# Patient Record
Sex: MALE | Race: WHITE | NOT HISPANIC OR LATINO | Employment: FULL TIME | URBAN - METROPOLITAN AREA
[De-identification: names, ages, dates, MRNs, and addresses within clinical notes are randomized per-mention and may not be internally consistent; named-entity substitution may affect disease eponyms.]

---

## 2021-05-21 ENCOUNTER — RECORDS - HEALTHEAST (OUTPATIENT)
Dept: GENERAL RADIOLOGY | Facility: CLINIC | Age: 61
End: 2021-05-21

## 2021-05-21 ENCOUNTER — OFFICE VISIT - HEALTHEAST (OUTPATIENT)
Dept: FAMILY MEDICINE | Facility: CLINIC | Age: 61
End: 2021-05-21

## 2021-05-21 DIAGNOSIS — K22.719 BARRETT'S ESOPHAGUS WITH DYSPLASIA: ICD-10-CM

## 2021-05-21 DIAGNOSIS — R07.9 CHEST PAIN, UNSPECIFIED: ICD-10-CM

## 2021-05-21 DIAGNOSIS — R07.9 ACUTE CHEST PAIN: ICD-10-CM

## 2021-05-21 LAB
ALBUMIN SERPL-MCNC: 4.2 G/DL (ref 3.5–5)
ALP SERPL-CCNC: 49 U/L (ref 45–120)
ALT SERPL W P-5'-P-CCNC: 43 U/L (ref 0–45)
ANION GAP SERPL CALCULATED.3IONS-SCNC: 9 MMOL/L (ref 5–18)
AST SERPL W P-5'-P-CCNC: 33 U/L (ref 0–40)
BILIRUB SERPL-MCNC: 1 MG/DL (ref 0–1)
BUN SERPL-MCNC: 12 MG/DL (ref 8–22)
CALCIUM SERPL-MCNC: 10 MG/DL (ref 8.5–10.5)
CHLORIDE BLD-SCNC: 100 MMOL/L (ref 98–107)
CO2 SERPL-SCNC: 28 MMOL/L (ref 22–31)
CREAT SERPL-MCNC: 1.17 MG/DL (ref 0.7–1.3)
D DIMER PPP FEU-MCNC: <=0.27 FEU UG/ML
ERYTHROCYTE [DISTWIDTH] IN BLOOD BY AUTOMATED COUNT: 12.4 % (ref 11–14.5)
FERRITIN SERPL-MCNC: 170 NG/ML (ref 27–300)
GFR SERPL CREATININE-BSD FRML MDRD: >60 ML/MIN/1.73M2
GLUCOSE BLD-MCNC: 84 MG/DL (ref 70–125)
HCT VFR BLD AUTO: 45.5 % (ref 40–54)
HGB BLD-MCNC: 15.5 G/DL (ref 14–18)
MAGNESIUM SERPL-MCNC: 2.2 MG/DL (ref 1.8–2.6)
MCH RBC QN AUTO: 31.3 PG (ref 27–34)
MCHC RBC AUTO-ENTMCNC: 34.1 G/DL (ref 32–36)
MCV RBC AUTO: 92 FL (ref 80–100)
PLATELET # BLD AUTO: 240 THOU/UL (ref 140–440)
PMV BLD AUTO: 9.9 FL (ref 7–10)
POTASSIUM BLD-SCNC: 4 MMOL/L (ref 3.5–5)
PROT SERPL-MCNC: 7.7 G/DL (ref 6–8)
RBC # BLD AUTO: 4.95 MILL/UL (ref 4.4–6.2)
SODIUM SERPL-SCNC: 137 MMOL/L (ref 136–145)
TROPONIN I SERPL-MCNC: <0.01 NG/ML (ref 0–0.29)
WBC: 7.2 THOU/UL (ref 4–11)

## 2021-05-21 ASSESSMENT — MIFFLIN-ST. JEOR: SCORE: 1604.13

## 2021-05-25 LAB
ATRIAL RATE - MUSE: 70 BPM
DIASTOLIC BLOOD PRESSURE - MUSE: NORMAL
INTERPRETATION ECG - MUSE: NORMAL
P AXIS - MUSE: 72 DEGREES
PR INTERVAL - MUSE: 190 MS
QRS DURATION - MUSE: 82 MS
QT - MUSE: 376 MS
QTC - MUSE: 406 MS
R AXIS - MUSE: 58 DEGREES
SYSTOLIC BLOOD PRESSURE - MUSE: NORMAL
T AXIS - MUSE: 50 DEGREES
VENTRICULAR RATE- MUSE: 70 BPM

## 2021-06-17 NOTE — PROGRESS NOTES
Assessment and Plan:     1. Acute chest pain  Electrocardiogram Perform and Read    XR Chest 2 Views    Troponin I    D-dimer, Quantitative    Echo Stress Exercise    Ambulatory referral to Gastroenterology - [place] [id number](CBC w/o Differential)    Ferritin   2. Mathew's esophagus with dysplasia  Ambulatory referral to Gastroenterology [place] [id number](CBC w/o Differential)    Comprehensive Metabolic Panel    Magnesium     Differentials include acute coronary syndrome, PE, asthma, emphysema, pneumonia, costochondritis, myofascial pain, anxiety, GERD.  EKG shows no acute changes.  Will have cardiology review.  Chest x-ray shows no acute infiltrate or cardiomegaly.  Will have radiology review.  Will obtain troponin and D-dimer.  Will order stress echocardiogram for further evaluation.  Offered referral to cardiology, but he declines.  Will rule out iron deficiency anemia, hypomagnesemia due to history of Mathew's esophagus.  Reflux symptoms may be contributing to the chest pain.  Will refer to gastroenterology.  If symptoms persist or worsen, suggest follow-up with PCP or ER evaluation.  He is content with the plan.    Subjective:     French is a 60 y.o. male presenting to the clinic for concerns for chest pain for 3 months.  Patient was diagnosed with Mathew's esophagus 15 years ago.  He was prescribed omeprazole twice daily.  Ultimately, he developed hypomagnesemia.  Since then, he has tried to manage his symptoms through weight loss.  He admits to some weight gain recently.  He is taking omeprazole 20 mg daily.  He does occasionally experience intermittent heartburn.  For the past 3 months, he has had pressure within his mid chest.  He states it is constant and exacerbates with exertion.  He has had rhinorrhea and questions if he had Covid approximately 1 year ago.  He has not traveled recently.  He recently assisted a neighbor with digging out a well.  2 weeks ago, his brother  of suspected  sudden cardiac arrest.  He denies any personal or family history of blood clots.    He also describes instances in his 20s where he collapsed and was hospitalized.  He believes the first spisode occurred when he had mono and the second occurred when he had cellulitis and lymphedema from a cat scratch.  He was told that he was experiencing renal and hepatic failure.  We will recheck this today.    Review of Systems: A complete 14 point review of systems was obtained and is negative or as stated in the history of present illness.    Social History     Socioeconomic History     Marital status:      Spouse name: Not on file     Number of children: Not on file     Years of education: Not on file     Highest education level: Not on file   Occupational History     Not on file   Social Needs     Financial resource strain: Not on file     Food insecurity     Worry: Not on file     Inability: Not on file     Transportation needs     Medical: Not on file     Non-medical: Not on file   Tobacco Use     Smoking status: Not on file   Substance and Sexual Activity     Alcohol use: Not on file     Drug use: Not on file     Sexual activity: Not on file   Lifestyle     Physical activity     Days per week: Not on file     Minutes per session: Not on file     Stress: Not on file   Relationships     Social connections     Talks on phone: Not on file     Gets together: Not on file     Attends L[employer] service: Not on file     Active member of club or organization: Not on file     Attends meetings of clubs or organizations: Not on file     Relationship status: Not on file     Intimate partner violence     Fear of current or ex partner: Not on file     Emotionally abused: Not on file     Physically abused: Not on file     Forced sexual activity: Not on file   Other Topics Concern     Not on file   Social History Narrative     Not on file       Active Ambulatory Problems     Diagnosis Date Noted     No Active Ambulatory Problems  "    Resolved Ambulatory Problems     Diagnosis Date Noted     No Resolved Ambulatory Problems     Past Medical History:   Diagnosis Date     Acid reflux        No family history on file.    Objective:     /78   Pulse 81   Ht 5' 9\" (1.753 m)   Wt 179 lb 6.4 oz (81.4 kg)   SpO2 97%   BMI 26.49 kg/m      Patient is alert, in no obvious distress.   Skin: Warm, dry.  No lesions or rashes.  Skin turgor rapid return.   HEENT:  Head normocephalic, atraumatic.  Eyes normal.   Ears normal.  Nose patent, mucosa pink.  Oropharynx mucosa pink.  No lesions or tonsillar enlargement.   Neck: Supple, no lymphadenopathy, JVD, bruits noted.  No thyromegaly.  Lungs:  Clear to auscultation. Respirations even and unlabored.  No wheezing or rales noted.   Heart:  Regular rate and rhythm.  No murmurs, S3, S4, gallops, or rubs.    Musculoskeletal:  Full ROM of extremities. No edema is present in bilateral lower extremities.     LABORATORY: I ordered and personally reviewed an EKG showing normal sinus rhythm, early repolariation.    I ordered and personally reviewed chest x-rays showing no obvious infiltrate or cardiomegaly.  Will have radiology review.                "

## 2021-07-01 ENCOUNTER — RECORDS - HEALTHEAST (OUTPATIENT)
Dept: ADMINISTRATIVE | Facility: OTHER | Age: 61
End: 2021-07-01

## 2021-07-06 VITALS
WEIGHT: 179.4 LBS | SYSTOLIC BLOOD PRESSURE: 110 MMHG | DIASTOLIC BLOOD PRESSURE: 78 MMHG | HEART RATE: 81 BPM | HEIGHT: 69 IN | BODY MASS INDEX: 26.57 KG/M2 | OXYGEN SATURATION: 97 %

## 2021-07-08 ENCOUNTER — RECORDS - HEALTHEAST (OUTPATIENT)
Dept: HEALTH INFORMATION MANAGEMENT | Facility: CLINIC | Age: 61
End: 2021-07-08

## 2021-08-21 ENCOUNTER — HEALTH MAINTENANCE LETTER (OUTPATIENT)
Age: 61
End: 2021-08-21

## 2021-08-23 ENCOUNTER — TELEPHONE (OUTPATIENT)
Dept: FAMILY MEDICINE | Facility: CLINIC | Age: 61
End: 2021-08-23

## 2021-08-23 DIAGNOSIS — R07.9 ACUTE CHEST PAIN: Primary | ICD-10-CM

## 2021-08-23 NOTE — TELEPHONE ENCOUNTER
----- Message from Ada Mejia sent at 2021 10:50 AM CDT -----  Regarding: new order  Good morning! Im trying to get this patient scheduled for a stress echo and it  yesterday, hoping you can place a new order for me. Thank you!    Ada Monteiro  [facility]  Non Invasive Heart Care

## 2021-09-01 ENCOUNTER — HOSPITAL ENCOUNTER (OUTPATIENT)
Dept: CARDIOLOGY | Facility: HOSPITAL | Age: 61
Discharge: HOME | End: 2021-09-01
Attending: NURSE PRACTITIONER | Admitting: NURSE PRACTITIONER
Payer: COMMERCIAL

## 2021-09-01 DIAGNOSIS — R07.9 ACUTE CHEST PAIN: ICD-10-CM

## 2021-09-01 PROCEDURE — 93016 CV STRESS TEST SUPVJ ONLY: CPT | Performed by: INTERNAL MEDICINE

## 2021-09-01 PROCEDURE — 93018 CV STRESS TEST I&R ONLY: CPT | Performed by: INTERNAL MEDICINE

## 2021-09-01 PROCEDURE — 93321 DOPPLER ECHO F-UP/LMTD STD: CPT | Mod: TC

## 2021-09-01 PROCEDURE — 93321 DOPPLER ECHO F-UP/LMTD STD: CPT | Mod: 26 | Performed by: INTERNAL MEDICINE

## 2021-09-01 PROCEDURE — C8928 TTE W OR W/O FOL W/CON,STRES: HCPCS

## 2021-09-01 PROCEDURE — 93350 STRESS TTE ONLY: CPT | Mod: 26 | Performed by: INTERNAL MEDICINE

## 2021-09-02 DIAGNOSIS — R94.31 ABNORMAL ELECTROCARDIOGRAM: Primary | ICD-10-CM

## 2021-09-16 DIAGNOSIS — L98.9 SKIN LESION: Primary | ICD-10-CM

## 2021-10-11 ENCOUNTER — OFFICE VISIT (OUTPATIENT)
Dept: CARDIOLOGY | Facility: CLINIC | Age: 61
End: 2021-10-11
Attending: NURSE PRACTITIONER
Payer: COMMERCIAL

## 2021-10-11 VITALS
HEART RATE: 68 BPM | RESPIRATION RATE: 20 BRPM | HEIGHT: 70 IN | WEIGHT: 186.4 LBS | DIASTOLIC BLOOD PRESSURE: 80 MMHG | BODY MASS INDEX: 26.69 KG/M2 | SYSTOLIC BLOOD PRESSURE: 118 MMHG

## 2021-10-11 DIAGNOSIS — R94.31 ABNORMAL ELECTROCARDIOGRAM: ICD-10-CM

## 2021-10-11 PROCEDURE — 99204 OFFICE O/P NEW MOD 45 MIN: CPT | Performed by: INTERNAL MEDICINE

## 2021-10-11 ASSESSMENT — MIFFLIN-ST. JEOR: SCORE: 1643.81

## 2021-10-11 NOTE — PROGRESS NOTES
French Aceves,  1960, MRN [id number]    PCP: Pily Medina, [phone]    Assessment: Chest tightness.  The symptom that he is describing can have a anginal quality to it and may suggest ischemic heart disease.  The stress echocardiogram is helpful that it shows no sign of previous infarction there is no evidence for ischemia by echo.  Unfortunately had this ventricular ectopic event that Dr. Weinstein is interpreted as possible ischemic association.  Given his history of hypercholesterolemia that he is a man I cannot exclude the possibility of ischemic heart disease I think that the likelihood is low.  Given these features I suggested to him that we could pursue CT angiography to determine if there is first evidence of obstructive coronary disease that would warrant further invasive evaluation.  Secondly, to see if atherosclerosis is present and given his hypercholesterolemia would consider aggressive statin therapy.  Left side is completely normal he can proceed with his colonoscopy.  Again I do not think he is going to be at high risk for complication I cannot exclude any risk however.  I will defer to anesthesiology if they are comfortable with that interpretation.    Recommendations: I have recommended CT angiography and the patient is going to defer at this time discussed with Simran mehta these options I have offered several Nitrostat therapy which can be helpful both for cardiac ischemia and for esophageal spasm which he has declined.    Chief Complaint: Chest tightness    HPI:  We have been requested by Pily Medina to evaluate French Aceves for consultation who is a  61 year old year old male for above chief complaint.    Hx: 61-year-old man is referred by Pily Medina for further evaluation of chest tightness.  She says that for the last 6 months he has been having episodes of pressure and tightness in his mid chest.  It can occur with exertion but sometimes at rest.  It happens on an  "almost daily basis.  Usually last for a minute or less.  It is often associated with dyspnea which can last for longer periods of time.  No presyncope or syncopal episodes.  Patient's he is also had a history of Mathew's esophagus.  Said at one point he had holes in his esophagus.  He had a long problem with symptomatic GERD.  He believes the chest tightness has been a part of that complex.  He is concerned is that these chest symptoms relate to his gastroesophageal reflux and esophagitis.  He saw a gastroenterologist who would recommended that he should have an upper GI evaluation but first needed colonoscopy.  When he went for colonoscopy this was canceled on the day of the procedure.  Apparently the anesthesiologist said that he needed \"clearance from cardiology \"before that they could proceed with the test.  He saw Simran janine to recommend a stress echo test.  Exercise was satisfactory and the echo part of the test was normal no sign of ischemia.  However at peak exercise he had a ventricular triplet.  Dr. Weinstein was concerned about this and for this reason gave this his interpretation as an equivocal response and recommended that he should have a further cardiac evaluation and testing to exclude ischemic heart disease.  He was symptomatically aware of the event when it occurred.  He has had no dizziness palpitations or syncope.  No history of hypercholesterolemia.  The only LDL I have on record was from 2013 and his total was 282 and LDL was 208.  He is never had high blood pressure in fact he has generally low blood pressure.  Had been a tobacco smoker but quit 2016.  There is no family of ischemic heart disease sudden death myocardial infarction.    During our discussion I inquired into his has history of cardiac testing.  He stated he had had the stress test recently.  At this point became unhappy.  He is frustrated that he wishes to have a GI evaluation for symptoms he is familiar with in the past and is " unable to have these tests until we have approved the procedure.  61-year-old man who has had a history of Mathew's esophagus in the past.  Has had issues with chest pain related to this.  Was seen and evaluated in May of this year for similar symptoms.Underwent an stress echocardiogram that showed normal left ventricular function at rest.  However he did have a fair amount of ectopy during the study.  There is no echocardiographic finding of ischemia      Current Outpatient Medications:      EPINEPHrine (EPIPEN) 0.3 mg/0.3 mL injection, [EPINEPHRINE (EPIPEN) 0.3 MG/0.3 ML INJECTION] Inject 0.3 mL (0.3 mg total) into the shoulder, thigh, or buttocks as needed for anaphylaxis. Inject into thigh., Disp: 3, Rfl: 0     omeprazole (PRILOSEC) 20 MG capsule, [OMEPRAZOLE (PRILOSEC) 20 MG CAPSULE] Take 20 mg by mouth daily before breakfast. Buys OTC., Disp: , Rfl:     Medical History  No past medical history on file.   No past medical history on file.   PAST MEDICAL HISTORY: No past medical history on file.    PAST SURGICAL HISTORY: No past surgical history on file.    FAMILY HISTORY: No family history on file.    SOCIAL HISTORY:   Social History     Tobacco Use     Smoking status: Former Smoker     Packs/day: 0.50     Years: 30.00     Pack years: 15.00     Quit date: 2016     Years since quittin.6     Smokeless tobacco: Never Used   Substance Use Topics     Alcohol use: Not on file      Surgical History  He  has no past surgical history on file.    Social History  Reviewed, and he  reports that he quit smoking about 5 years ago. He has a 15.00 pack-year smoking history. He has never used smokeless tobacco.  Smoking status reviewed.  Social history othrwise not contributory to HPI.  Allergies  Allergies   Allergen Reactions     [place] [[employer]] Unknown       Family History  Reviewed, and family history is not on file.  Extended Emergency Contact Information  Primary Emergency Contact: Ketan Omalley  "States  Home Phone: [phone]  Relation: Other  Family history otherwise negative or not conributory to HPI.    Psychosocial Needs  Social History     Social History Narrative     Not on file     Additional psychosocial needs reviewed per nursing assessment.    Prior to Admission Medications  (Not in a hospital admission)      Review of Systems:  Pertinent items are noted in HPI.  Review of systems is negative except for HPI  Physical Exam:    BP Readings from Last 1 Encounters:   10/11/21 118/80     Pulse Readings from Last 1 Encounters:   10/11/21 68     Wt Readings from Last 1 Encounters:   10/11/21 84.6 kg (186 lb 6.4 oz)     Ht Readings from Last 1 Encounters:   10/11/21 1.765 m (5' 9.5\")     Estimated body mass index is 27.13 kg/m  as calculated from the following:    Height as of this encounter: 1.765 m (5' 9.5\").    Weight as of this encounter: 84.6 kg (186 lb 6.4 oz).    Head and neck without focal cranial neurologic defects.  JVD not distended.  Carotid upstroke normal without bruit.  External eye exam normal without icterus.  External ear exam normal.  Neck without cervical lymphadenopathy or thyromegaly.  Cardiac: S1-S2 distinct and regular without extra sound  Lungs: Clear  Abdomen with normal bowel tones.  Skin without rash, ecchymosis, lesions.  Neuromuscular tone normal.  Peripheral pulse intact and equal.  Joints without swelling or erythema.    No results found for: CHOL  No results found for: HDL  No results found for: LDL  No results found for: TRIG  No results found for: CHOLHDLRATIO Last Comprehensive Metabolic Panel:  Sodium   Date Value Ref Range Status   05/21/2021 137 136 - 145 mmol/L Final     Potassium   Date Value Ref Range Status   05/21/2021 4.0 3.5 - 5.0 mmol/L Final     Chloride   Date Value Ref Range Status   05/21/2021 100 98 - 107 mmol/L Final     Carbon Dioxide (CO2)   Date Value Ref Range Status   05/21/2021 28 22 - 31 mmol/L Final     Anion Gap   Date Value Ref Range Status "   05/21/2021 9 5 - 18 mmol/L Final     Glucose   Date Value Ref Range Status   05/21/2021 84 70 - 125 mg/dL Final     Urea Nitrogen   Date Value Ref Range Status   05/21/2021 12 8 - 22 mg/dL Final     Creatinine   Date Value Ref Range Status   05/21/2021 1.17 0.70 - 1.30 mg/dL Final     GFR Estimate   Date Value Ref Range Status   05/21/2021 >60 >60 mL/min/1.73m2 Final     Calcium   Date Value Ref Range Status   05/21/2021 10.0 8.5 - 10.5 mg/dL Final

## 2021-10-11 NOTE — LETTER
10/11/2021    Pily Medina, APRN CNP  [address] [place] [address]    RE: French Aceves       Dear Colleague,    I had the pleasure of seeing French Aceves in the [facility].           French Aceves,  1960, MRN [id number]    PCP: Pily Medina, [phone]    Assessment: Chest tightness.  The symptom that he is describing can have a anginal quality to it and may suggest ischemic heart disease.  The stress echocardiogram is helpful that it shows no sign of previous infarction there is no evidence for ischemia by echo.  Unfortunately had this ventricular ectopic event that Dr. Weinstein is interpreted as possible ischemic association.  Given his history of hypercholesterolemia that he is a man I cannot exclude the possibility of ischemic heart disease I think that the likelihood is low.  Given these features I suggested to him that we could pursue CT angiography to determine if there is first evidence of obstructive coronary disease that would warrant further invasive evaluation.  Secondly, to see if atherosclerosis is present and given his hypercholesterolemia would consider aggressive statin therapy.  Left side is completely normal he can proceed with his colonoscopy.  Again I do not think he is going to be at high risk for complication I cannot exclude any risk however.  I will defer to anesthesiology if they are comfortable with that interpretation.    Recommendations: I have recommended CT angiography and the patient is going to defer at this time discussed with Simran mehta these options I have offered several Nitrostat therapy which can be helpful both for cardiac ischemia and for esophageal spasm which he has declined.    Chief Complaint: Chest tightness    HPI:  We have been requested by Pily Medina to evaluate French Aceves for consultation who is a  61 year old year old male for above chief complaint.    Hx: 61-year-old man  "is referred by Pily Medina for further evaluation of chest tightness.  She says that for the last 6 months he has been having episodes of pressure and tightness in his mid chest.  It can occur with exertion but sometimes at rest.  It happens on an almost daily basis.  Usually last for a minute or less.  It is often associated with dyspnea which can last for longer periods of time.  No presyncope or syncopal episodes.  Patient's he is also had a history of Mathew's esophagus.  Said at one point he had holes in his esophagus.  He had a long problem with symptomatic GERD.  He believes the chest tightness has been a part of that complex.  He is concerned is that these chest symptoms relate to his gastroesophageal reflux and esophagitis.  He saw a gastroenterologist who would recommended that he should have an upper GI evaluation but first needed colonoscopy.  When he went for colonoscopy this was canceled on the day of the procedure.  Apparently the anesthesiologist said that he needed \"clearance from cardiology \"before that they could proceed with the test.  He saw Simran mehta to recommend a stress echo test.  Exercise was satisfactory and the echo part of the test was normal no sign of ischemia.  However at peak exercise he had a ventricular triplet.  Dr. Weinstein was concerned about this and for this reason gave this his interpretation as an equivocal response and recommended that he should have a further cardiac evaluation and testing to exclude ischemic heart disease.  He was symptomatically aware of the event when it occurred.  He has had no dizziness palpitations or syncope.  No history of hypercholesterolemia.  The only LDL I have on record was from 2013 and his total was 282 and LDL was 208.  He is never had high blood pressure in fact he has generally low blood pressure.  Had been a tobacco smoker but quit 2016.  There is no family of ischemic heart disease sudden death myocardial infarction.    During our " discussion I inquired into his has history of cardiac testing.  He stated he had had the stress test recently.  At this point became unhappy.  He is frustrated that he wishes to have a GI evaluation for symptoms he is familiar with in the past and is unable to have these tests until we have approved the procedure.  61-year-old man who has had a history of Mathew's esophagus in the past.  Has had issues with chest pain related to this.  Was seen and evaluated in May of this year for similar symptoms.Underwent an stress echocardiogram that showed normal left ventricular function at rest.  However he did have a fair amount of ectopy during the study.  There is no echocardiographic finding of ischemia      Current Outpatient Medications:      EPINEPHrine (EPIPEN) 0.3 mg/0.3 mL injection, [EPINEPHRINE (EPIPEN) 0.3 MG/0.3 ML INJECTION] Inject 0.3 mL (0.3 mg total) into the shoulder, thigh, or buttocks as needed for anaphylaxis. Inject into thigh., Disp: 3, Rfl: 0     omeprazole (PRILOSEC) 20 MG capsule, [OMEPRAZOLE (PRILOSEC) 20 MG CAPSULE] Take 20 mg by mouth daily before breakfast. Buys OTC., Disp: , Rfl:     Medical History  No past medical history on file.   No past medical history on file.   PAST MEDICAL HISTORY: No past medical history on file.    PAST SURGICAL HISTORY: No past surgical history on file.    FAMILY HISTORY: No family history on file.    SOCIAL HISTORY:   Social History     Tobacco Use     Smoking status: Former Smoker     Packs/day: 0.50     Years: 30.00     Pack years: 15.00     Quit date: 2016     Years since quittin.6     Smokeless tobacco: Never Used   Substance Use Topics     Alcohol use: Not on file      Surgical History  He  has no past surgical history on file.    Social History  Reviewed, and he  reports that he quit smoking about 5 years ago. He has a 15.00 pack-year smoking history. He has never used smokeless tobacco.  Smoking status reviewed.  Social history othrwise not  "contributory to HPI.  Allergies  Allergies   Allergen Reactions     [place] [[employer]] Unknown       Family History  Reviewed, and family history is not on file.  Extended Emergency Contact Information  Primary Emergency Contact: Ketan Omalley   [facility]  Home Phone: [phone]  Relation: Other  Family history otherwise negative or not conributory to HPI.    Psychosocial Needs  Social History     Social History Narrative     Not on file     Additional psychosocial needs reviewed per nursing assessment.    Prior to Admission Medications  (Not in a hospital admission)      Review of Systems:  Pertinent items are noted in HPI.  Review of systems is negative except for HPI  Physical Exam:    BP Readings from Last 1 Encounters:   10/11/21 118/80     Pulse Readings from Last 1 Encounters:   10/11/21 68     Wt Readings from Last 1 Encounters:   10/11/21 84.6 kg (186 lb 6.4 oz)     Ht Readings from Last 1 Encounters:   10/11/21 1.765 m (5' 9.5\")     Estimated body mass index is 27.13 kg/m  as calculated from the following:    Height as of this encounter: 1.765 m (5' 9.5\").    Weight as of this encounter: 84.6 kg (186 lb 6.4 oz).    Head and neck without focal cranial neurologic defects.  JVD not distended.  Carotid upstroke normal without bruit.  External eye exam normal without icterus.  External ear exam normal.  Neck without cervical lymphadenopathy or thyromegaly.  Cardiac: S1-S2 distinct and regular without extra sound  Lungs: Clear  Abdomen with normal bowel tones.  Skin without rash, ecchymosis, lesions.  Neuromuscular tone normal.  Peripheral pulse intact and equal.  Joints without swelling or erythema.    No results found for: CHOL  No results found for: HDL  No results found for: LDL  No results found for: TRIG  No results found for: CHOLHDLRATIO Last Comprehensive Metabolic Panel:  Sodium   Date Value Ref Range Status   05/21/2021 137 136 - 145 mmol/L Final     Potassium   Date Value Ref Range Status "   05/21/2021 4.0 3.5 - 5.0 mmol/L Final     Chloride   Date Value Ref Range Status   05/21/2021 100 98 - 107 mmol/L Final     Carbon Dioxide (CO2)   Date Value Ref Range Status   05/21/2021 28 22 - 31 mmol/L Final     Anion Gap   Date Value Ref Range Status   05/21/2021 9 5 - 18 mmol/L Final     Glucose   Date Value Ref Range Status   05/21/2021 84 70 - 125 mg/dL Final     Urea Nitrogen   Date Value Ref Range Status   05/21/2021 12 8 - 22 mg/dL Final     Creatinine   Date Value Ref Range Status   05/21/2021 1.17 0.70 - 1.30 mg/dL Final     GFR Estimate   Date Value Ref Range Status   05/21/2021 >60 >60 mL/min/1.73m2 Final     Calcium   Date Value Ref Range Status   05/21/2021 10.0 8.5 - 10.5 mg/dL Final                                  Thank you for allowing me to participate in the care of your patient.      Sincerely,     Reggie Mansfield MD     [facility]  cc:   Pily Medina, APRN CNP  [address] [place] [address]

## 2021-10-16 ENCOUNTER — HEALTH MAINTENANCE LETTER (OUTPATIENT)
Age: 61
End: 2021-10-16

## 2022-10-01 ENCOUNTER — HEALTH MAINTENANCE LETTER (OUTPATIENT)
Age: 62
End: 2022-10-01

## 2023-10-15 ENCOUNTER — HEALTH MAINTENANCE LETTER (OUTPATIENT)
Age: 63
End: 2023-10-15

## 2024-07-21 ENCOUNTER — HEALTH MAINTENANCE LETTER (OUTPATIENT)
Age: 64
End: 2024-07-21